# Patient Record
Sex: FEMALE | Race: WHITE
[De-identification: names, ages, dates, MRNs, and addresses within clinical notes are randomized per-mention and may not be internally consistent; named-entity substitution may affect disease eponyms.]

---

## 2019-07-20 ENCOUNTER — HOSPITAL ENCOUNTER (EMERGENCY)
Dept: HOSPITAL 26 - MED | Age: 24
Discharge: HOME | End: 2019-07-20
Payer: COMMERCIAL

## 2019-07-20 VITALS — DIASTOLIC BLOOD PRESSURE: 67 MMHG | SYSTOLIC BLOOD PRESSURE: 102 MMHG

## 2019-07-20 VITALS — HEIGHT: 64.5 IN | WEIGHT: 118.25 LBS | BODY MASS INDEX: 19.94 KG/M2

## 2019-07-20 DIAGNOSIS — N30.90: Primary | ICD-10-CM

## 2019-07-20 DIAGNOSIS — Z88.1: ICD-10-CM

## 2019-07-20 NOTE — NUR
BIB COUSIN. AAOX 4 C/O ABDOMINAL PAIN, WITH CRAMPING. STATES TO HAVE SLIGHT 
SPOTTING, IRREGULAR MENSTURAL CYCLE. SLIGHT SPOTTING AT THIS TIME.+ NAUSEA X 3 
DAYS. TOOK TYLENOL TODAY WITH MILD RELIEF. ER MD TO SEE THE PT.



MED HX: DENIES

ALLERGIES: AMOXICILLIN

MED RX: DENIES

## 2019-12-28 ENCOUNTER — HOSPITAL ENCOUNTER (EMERGENCY)
Dept: HOSPITAL 26 - MED | Age: 24
Discharge: HOME | End: 2019-12-28
Payer: COMMERCIAL

## 2019-12-28 VITALS — SYSTOLIC BLOOD PRESSURE: 109 MMHG | DIASTOLIC BLOOD PRESSURE: 69 MMHG

## 2019-12-28 VITALS — SYSTOLIC BLOOD PRESSURE: 112 MMHG | DIASTOLIC BLOOD PRESSURE: 63 MMHG

## 2019-12-28 VITALS — WEIGHT: 115 LBS | BODY MASS INDEX: 19.63 KG/M2 | HEIGHT: 64 IN

## 2019-12-28 DIAGNOSIS — Z88.1: ICD-10-CM

## 2019-12-28 DIAGNOSIS — B96.89: ICD-10-CM

## 2019-12-28 DIAGNOSIS — B97.89: ICD-10-CM

## 2019-12-28 DIAGNOSIS — J02.8: Primary | ICD-10-CM

## 2019-12-28 NOTE — NUR
25 Y/O FEMALE PRESENTS TO ED, C/O SORE THROAT. PT STATES PAIN STARTED 4 DAYS 
AGO ALONG WITH ABDOMINAL PAIN. PT C/O N/V, LAST EPISODE WAS AROUND 1900 TODAY. 
PT STATES HAVING FEVER, TEMP  PRIOR COMING TO ED; PT AFEBRILE AT THIS 
TIME. NO SOB/DIFFICULTY BREATHING. PT VSS. ERMD AWARE. WILL CONTINUE TO 
MONITOR.

## 2019-12-28 NOTE — NUR
PT DISCHARGED WITH PAPERWORK. EDUCATED PT REGARDING MEDICATIONS AND D/C 
DIAGNOSIS. PT VERBALIZED UNDERSTANDING OF TEACHING. TOLD PT TO FOLLOW UP WITH 
PCP AND WHEN TO RETURN TO ED. PT AT STABLE CONDITION. ALL QUESTIONS ANSWERED.